# Patient Record
Sex: FEMALE | Race: BLACK OR AFRICAN AMERICAN | Employment: FULL TIME
[De-identification: names, ages, dates, MRNs, and addresses within clinical notes are randomized per-mention and may not be internally consistent; named-entity substitution may affect disease eponyms.]

---
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
## 2023-01-04 ENCOUNTER — PATIENT MESSAGE (OUTPATIENT)
Dept: UROGYNECOLOGY | Age: 18
End: 2023-01-04

## 2023-02-13 ENCOUNTER — TELEPHONE (OUTPATIENT)
Dept: OBGYN CLINIC | Age: 18
End: 2023-02-13

## 2023-02-13 NOTE — TELEPHONE ENCOUNTER
Patient called RN back, patient verified PHIs, patient stated they lost two out of her three birth control packs and was wondering what they need to do to obtain their RX again. RN states that they will need to contact pharmacy to refill those two lost packets of BC, due to another RX being sent in will not resolve the issue of replacing the lost BC pills. Patient verbalized understanding.

## 2023-03-21 ENCOUNTER — OFFICE VISIT (OUTPATIENT)
Dept: OBGYN CLINIC | Age: 18
End: 2023-03-21
Payer: COMMERCIAL

## 2023-03-21 VITALS
BODY MASS INDEX: 20.41 KG/M2 | SYSTOLIC BLOOD PRESSURE: 110 MMHG | HEIGHT: 66 IN | WEIGHT: 127 LBS | DIASTOLIC BLOOD PRESSURE: 60 MMHG

## 2023-03-21 DIAGNOSIS — E28.2 PCOS (POLYCYSTIC OVARIAN SYNDROME): ICD-10-CM

## 2023-03-21 PROCEDURE — 99212 OFFICE O/P EST SF 10 MIN: CPT | Performed by: NURSE PRACTITIONER

## 2023-03-21 RX ORDER — BUPROPION HYDROCHLORIDE 100 MG/1
100 TABLET, EXTENDED RELEASE ORAL EVERY MORNING
COMMUNITY
Start: 2023-02-22

## 2023-03-21 ASSESSMENT — PATIENT HEALTH QUESTIONNAIRE - PHQ9
SUM OF ALL RESPONSES TO PHQ QUESTIONS 1-9: 0
1. LITTLE INTEREST OR PLEASURE IN DOING THINGS: 0
SUM OF ALL RESPONSES TO PHQ QUESTIONS 1-9: 0
SUM OF ALL RESPONSES TO PHQ QUESTIONS 1-9: 0
SUM OF ALL RESPONSES TO PHQ9 QUESTIONS 1 & 2: 0
2. FEELING DOWN, DEPRESSED OR HOPELESS: 0
SUM OF ALL RESPONSES TO PHQ QUESTIONS 1-9: 0

## 2023-03-21 NOTE — ASSESSMENT & PLAN NOTE
11/30/2022:     Menses Q 2-3 months  Will complete TSH, PRL, POF and PCOS labs today and f/u 2 weeks for US and visit  We discuss trial of OCPs and pt would like to wait until after labs/US to discuss            12/21/22:  Declines upt, not currently sexually active  Pt meets 2/3 Rotterdham criteria (polycystic ovaries and anovulatory bleeding)  Lengthy D/W pt about diagnosis, etiology and treatment options, including but not limited to: weight gain, anovulation, hirsutism, effect on fertility, increased risks for CAD and endometrial hyperplasia.   Encouraged increase in exercise and recommended low sugar/carb diet.       Will trial OCPs and RTO 3 months    3/21/2023: pt doing well with OCPs, no concerns today  F/u Dec for annual exam

## 2023-03-22 NOTE — PROGRESS NOTES
I have reviewed the patient's visit today including history, exam and assessment by HEYDI Brunner. I agree with treatment/plan as above.     Alexandre Santiago MD  11:40 AM  03/22/23
Patient comes in today for 3 month follow up on birth control for PCOS. Patient states she thinks the birth control is working. LAST PAP:  Never    LAST MAMMO:  Never    LMP:  Patient's last menstrual period was 03/18/2023 (exact date).     BIRTH CONTROL:  OCP (estrogen/progesterone)    TOBACCO USE:  No    FAMILY HISTORY OF:   Breast Cancer:  No   Ovarian Cancer:  No   Uterine Cancer:  No   Colon Cancer:  No    Vitals:    03/21/23 1541   BP: 110/60   Site: Left Upper Arm   Position: Sitting   Weight: 127 lb (57.6 kg)   Height: 5' 6\" (1.676 m)        Mireya Peterson MA  03/21/23  3:50 PM
OCPs and pt would like to wait until after labs/US to discuss            12/21/22:  Declines upt, not currently sexually active  Pt meets 2/3 Rotterdham criteria (polycystic ovaries and anovulatory bleeding)  Lengthy D/W pt about diagnosis, etiology and treatment options, including but not limited to: weight gain, anovulation, hirsutism, effect on fertility, increased risks for CAD and endometrial hyperplasia. Encouraged increase in exercise and recommended low sugar/carb diet. Will trial OCPs and RTO 3 months    3/21/2023: pt doing well with OCPs, no concerns today  F/u Dec for annual exam            No orders of the defined types were placed in this encounter. Outpatient Encounter Medications as of 3/21/2023   Medication Sig Dispense Refill    buPROPion (WELLBUTRIN SR) 100 MG extended release tablet Take 100 mg by mouth every morning      norethindrone-ethinyl estradiol (LOESTRIN FE 1/20) 1-20 MG-MCG per tablet Take 1 tablet by mouth daily 3 packet 4    albuterol sulfate HFA (PROVENTIL;VENTOLIN;PROAIR) 108 (90 Base) MCG/ACT inhaler Inhale 1 puff into the lungs every 4 hours as needed for Wheezing 18 g 0    loratadine (CLARITIN) 10 MG tablet Take 10 mg by mouth daily as needed      escitalopram (LEXAPRO) 10 MG tablet TAKE 1/2 A TABLET BY MOUTH EVERYDAY FOR 1 WEEK, THEN TAKE 1 TABLET BY MOUTH DAILY (Patient not taking: No sig reported)      [DISCONTINUED] metroNIDAZOLE (FLAGYL) 500 MG tablet Take 1 tablet by mouth 2 times daily (Patient not taking: Reported on 12/21/2022) 14 tablet 0     No facility-administered encounter medications on file as of 3/21/2023.                Corin Degroot, APRN - CNP 03/21/23 4:09 PM

## 2023-05-19 ENCOUNTER — OFFICE VISIT (OUTPATIENT)
Dept: OBGYN CLINIC | Age: 18
End: 2023-05-19

## 2023-05-19 VITALS
DIASTOLIC BLOOD PRESSURE: 56 MMHG | HEIGHT: 66 IN | SYSTOLIC BLOOD PRESSURE: 102 MMHG | BODY MASS INDEX: 20.73 KG/M2 | WEIGHT: 129 LBS

## 2023-05-19 DIAGNOSIS — Z11.3 SCREEN FOR STD (SEXUALLY TRANSMITTED DISEASE): ICD-10-CM

## 2023-05-19 DIAGNOSIS — N91.2 AMENORRHEA: Primary | ICD-10-CM

## 2023-05-19 DIAGNOSIS — L98.9 ABNORMAL SKIN OF VULVA: ICD-10-CM

## 2023-05-19 LAB
HCG, PREGNANCY, URINE, POC: NEGATIVE
VALID INTERNAL CONTROL, POC: YES

## 2023-05-19 ASSESSMENT — PATIENT HEALTH QUESTIONNAIRE - PHQ9
SUM OF ALL RESPONSES TO PHQ QUESTIONS 1-9: 0
SUM OF ALL RESPONSES TO PHQ QUESTIONS 1-9: 0
1. LITTLE INTEREST OR PLEASURE IN DOING THINGS: 0
SUM OF ALL RESPONSES TO PHQ9 QUESTIONS 1 & 2: 0
2. FEELING DOWN, DEPRESSED OR HOPELESS: 0
SUM OF ALL RESPONSES TO PHQ QUESTIONS 1-9: 0
SUM OF ALL RESPONSES TO PHQ QUESTIONS 1-9: 0

## 2023-05-19 NOTE — ASSESSMENT & PLAN NOTE
Post fourchette 4-5 small areas of hyperpigmentation, no lesions/ulcerations noted  Pt requests GC/CT/TV testing  We discuss today could be normal variation with hormonal treatment or possibly beginning changes of wart development, but if lesions change to RTO for visit.  F/u scheduled 3 months

## 2023-05-19 NOTE — PROGRESS NOTES
Patient here for concerns of \"bumps\" that patient first noticed approx 1 month ago that patient describes as mole\" like. Patient denies burning and itching today. LAST PAP:  never     LAST MAMMO:  never     LMP:  Patient's last menstrual period was 04/20/2023 (exact date). BIRTH CONTROL:  none    Patient stopped BC due to lack of period. Patient stopped taking it beginning of May 2023.      TOBACCO USE:  No    FAMILY HISTORY OF:   Breast Cancer:  No   Ovarian Cancer:  No   Uterine Cancer:  No   Colon Cancer:  No    Vitals:    05/19/23 1114   BP: (!) 102/56   Site: Left Upper Arm   Position: Sitting   Weight: 129 lb (58.5 kg)   Height: 5' 6\" (1.676 m)        Arsh Hughes RN  05/19/23  11:25 AM
changes of wart development, but if lesions change to RTO for visit. F/u scheduled 3 months            Other    Amenorrhea - Primary     Normal bleeding variant with OCPs  UPT neg  Pt advised she can continue         Relevant Orders    AMB POC URINE PREGNANCY TEST, VISUAL COLOR COMPARISON (Completed)     Other Visit Diagnoses       Screen for STD (sexually transmitted disease)        Relevant Orders    Chlamydia, Gonorrhea, Trichomoniasis            Orders Placed This Encounter   Procedures    Chlamydia, Gonorrhea, Trichomoniasis    AMB POC URINE PREGNANCY TEST, VISUAL COLOR COMPARISON       Outpatient Encounter Medications as of 5/19/2023   Medication Sig Dispense Refill    buPROPion (WELLBUTRIN SR) 100 MG extended release tablet Take 1 tablet by mouth every morning      albuterol sulfate HFA (PROVENTIL;VENTOLIN;PROAIR) 108 (90 Base) MCG/ACT inhaler Inhale 1 puff into the lungs every 4 hours as needed for Wheezing 18 g 0    loratadine (CLARITIN) 10 MG tablet Take 1 tablet by mouth daily as needed      norethindrone-ethinyl estradiol (LOESTRIN FE 1/20) 1-20 MG-MCG per tablet Take 1 tablet by mouth daily (Patient not taking: Reported on 5/19/2023) 3 packet 4    [DISCONTINUED] escitalopram (LEXAPRO) 10 MG tablet TAKE 1/2 A TABLET BY MOUTH EVERYDAY FOR 1 WEEK, THEN TAKE 1 TABLET BY MOUTH DAILY (Patient not taking: No sig reported)       No facility-administered encounter medications on file as of 5/19/2023.                ROSETTE Liu CNP 05/19/23 11:44 AM

## 2023-05-23 LAB
C TRACH RRNA SPEC QL NAA+PROBE: NEGATIVE
N GONORRHOEA RRNA SPEC QL NAA+PROBE: NEGATIVE
SPECIMEN SOURCE: NORMAL
T VAGINALIS RRNA SPEC QL NAA+PROBE: NEGATIVE

## 2023-08-15 ENCOUNTER — OFFICE VISIT (OUTPATIENT)
Dept: OBGYN CLINIC | Age: 18
End: 2023-08-15
Payer: COMMERCIAL

## 2023-08-15 VITALS
HEIGHT: 66 IN | SYSTOLIC BLOOD PRESSURE: 112 MMHG | BODY MASS INDEX: 22.02 KG/M2 | DIASTOLIC BLOOD PRESSURE: 58 MMHG | WEIGHT: 137 LBS

## 2023-08-15 DIAGNOSIS — N92.6 LATE MENSES: ICD-10-CM

## 2023-08-15 DIAGNOSIS — R30.0 DYSURIA: Primary | ICD-10-CM

## 2023-08-15 DIAGNOSIS — N89.8 VAGINAL DISCHARGE: ICD-10-CM

## 2023-08-15 LAB
BILIRUBIN, URINE, POC: NEGATIVE
BLOOD URINE, POC: NEGATIVE
GLUCOSE URINE, POC: NEGATIVE
HCG, PREGNANCY, URINE, POC: NEGATIVE
KETONES, URINE, POC: NEGATIVE
LEUKOCYTE ESTERASE, URINE, POC: ABNORMAL
NITRITE, URINE, POC: NEGATIVE
PH, URINE, POC: 7.5 (ref 4.6–8)
PROTEIN,URINE, POC: 100
SPECIFIC GRAVITY, URINE, POC: 1.02 (ref 1–1.03)
URINALYSIS CLARITY, POC: ABNORMAL
URINALYSIS COLOR, POC: YELLOW
UROBILINOGEN, POC: NORMAL
VALID INTERNAL CONTROL, POC: YES

## 2023-08-15 PROCEDURE — 81025 URINE PREGNANCY TEST: CPT | Performed by: NURSE PRACTITIONER

## 2023-08-15 PROCEDURE — 81001 URINALYSIS AUTO W/SCOPE: CPT | Performed by: NURSE PRACTITIONER

## 2023-08-15 PROCEDURE — 99213 OFFICE O/P EST LOW 20 MIN: CPT | Performed by: NURSE PRACTITIONER

## 2023-08-15 RX ORDER — FLUCONAZOLE 150 MG/1
150 TABLET ORAL
Qty: 2 TABLET | Refills: 0 | Status: SHIPPED | OUTPATIENT
Start: 2023-08-15 | End: 2023-08-19

## 2023-08-15 ASSESSMENT — PATIENT HEALTH QUESTIONNAIRE - PHQ9
SUM OF ALL RESPONSES TO PHQ QUESTIONS 1-9: 2
SUM OF ALL RESPONSES TO PHQ9 QUESTIONS 1 & 2: 2
SUM OF ALL RESPONSES TO PHQ QUESTIONS 1-9: 2
2. FEELING DOWN, DEPRESSED OR HOPELESS: 1
SUM OF ALL RESPONSES TO PHQ QUESTIONS 1-9: 2
1. LITTLE INTEREST OR PLEASURE IN DOING THINGS: 1
SUM OF ALL RESPONSES TO PHQ QUESTIONS 1-9: 2

## 2023-08-15 NOTE — PROGRESS NOTES
I have reviewed the patient's visit today including history, exam and assessment by HEYDI Diop. I agree with treatment/plan as above.     Quintin Milan MD  4:14 PM  08/15/23

## 2023-08-15 NOTE — ASSESSMENT & PLAN NOTE
Small amt of discharge c/w yeast  nuswab collected  rx sent for diflucan  UA 1+ leuks, will send for culture

## 2023-08-15 NOTE — PROGRESS NOTES
Patient here for concerns of vaginal irritation, groin itching, and urinary burning for the last 1-2 weeks. F/u on BC-patient states she she would like to continue her [facility]. LAST PAP:  never    LAST MAMMO:  never    LMP:  Patient's last menstrual period was 07/14/2023 (approximate).     Sexually active:  yes     BIRTH CONTROL:  OCP (estrogen/progesterone)    TOBACCO USE:  No    PHQ: 2      FAMILY HISTORY OF:   Breast Cancer:  No   Ovarian Cancer:  No   Uterine Cancer:  No   Colon Cancer:  No    Vitals:    08/15/23 1528   BP: (!) 112/58   Site: Left Upper Arm   Position: Sitting   Weight: 137 lb (62.1 kg)   Height: 5' 6\" (1.676 m)        Radha Rodriguez RN  08/15/23  3:34 PM

## 2023-08-15 NOTE — PROGRESS NOTES
Collin Sutton is a 19 y.o. Nguyễn Scruggs who is here with c/o vaginal itching and dysuria for the last week. Denies discharge and odor. Has been working out more lately. Denies recent antibiotic use or change in hygiene products. Pt on OCPs for birth control      Patient's last menstrual period was 07/14/2023 (approximate). Past Medical History:   Diagnosis Date    ADHD     Asthma     chronic    Asthma, mild intermittent     Depression     Difficulty controlling anger     Dysuria     Exercise-induced asthma     OE (otitis externa)        History reviewed. No pertinent surgical history. Family History   Problem Relation Age of Onset    No Known Problems Father     No Known Problems Mother     Breast Cancer Neg Hx     Ovarian Cancer Neg Hx     Uterine Cancer Neg Hx     Colon Cancer Neg Hx        Social History     Socioeconomic History    Marital status: Single     Spouse name: Not on file    Number of children: Not on file    Years of education: Not on file    Highest education level: Not on file   Occupational History    Not on file   Tobacco Use    Smoking status: Never    Smokeless tobacco: Never   Substance and Sexual Activity    Alcohol use: No    Drug use: No    Sexual activity: Yes     Birth control/protection: OCP   Other Topics Concern    Not on file   Social History Narrative    Abuse: Feels safe at home, no history of physical abuse, no history of sexual abuse      Social Determinants of Health     Financial Resource Strain: Not on file   Food Insecurity: Not on file   Transportation Needs: Not on file   Physical Activity: Not on file   Stress: Not on file   Social Connections: Not on file   Intimate Partner Violence: Not on file   Housing Stability: Not on file           Objective:    Vitals:    08/15/23 1528   BP: (!) 112/58   Site: Left Upper Arm   Position: Sitting   Weight: 137 lb (62.1 kg)   Height: 5' 6\" (1.676 m)         Constitutional:  well-developed, well-nourished, and in no distress.

## 2023-08-17 LAB
BACTERIA SPEC CULT: ABNORMAL
SERVICE CMNT-IMP: ABNORMAL

## 2023-08-17 NOTE — TELEPHONE ENCOUNTER
Called pt, phone went straight to voicemail, voicemail box was not set up so unable to LVM.      [employer] message sent

## 2023-08-17 NOTE — TELEPHONE ENCOUNTER
Urine cx is positive for UTI.     Needs to start Amoxicillin 500 mg PO Q8 hours for 7 days Disp: 21 RF:0

## 2023-08-18 ENCOUNTER — TELEPHONE (OUTPATIENT)
Dept: OBGYN CLINIC | Age: 18
End: 2023-08-18

## 2023-08-18 LAB
A VAGINAE DNA VAG QL NAA+PROBE: NORMAL SCORE
BVAB2 DNA VAG QL NAA+PROBE: NORMAL SCORE
C ALBICANS DNA VAG QL NAA+PROBE: NEGATIVE
C GLABRATA DNA VAG QL NAA+PROBE: NEGATIVE
C TRACH RRNA SPEC QL NAA+PROBE: NEGATIVE
MEGA1 DNA VAG QL NAA+PROBE: NORMAL SCORE
N GONORRHOEA RRNA SPEC QL NAA+PROBE: NEGATIVE
SPECIMEN SOURCE: NORMAL
T VAGINALIS RRNA SPEC QL NAA+PROBE: NEGATIVE

## 2023-08-18 RX ORDER — AMOXICILLIN 500 MG/1
500 CAPSULE ORAL EVERY 8 HOURS
Qty: 21 CAPSULE | Refills: 0 | Status: SHIPPED | OUTPATIENT
Start: 2023-08-18 | End: 2023-08-25

## 2023-08-24 ENCOUNTER — TELEPHONE (OUTPATIENT)
Dept: OBGYN CLINIC | Age: 18
End: 2023-08-24

## 2023-08-24 LAB
C ALBICANS DNA VAG QL NAA+PROBE: NORMAL
C GLABRATA DNA VAG QL NAA+PROBE: NORMAL
CANDIDA ALBICANS: NORMAL
CANDIDA GLABRATA: NORMAL
CANDIDA KRUSEI: NEGATIVE
CANDIDA LUSITANIAE, NAA, 180015: NEGATIVE
CANDIDA PARAPSILOSIS DNA, VAG: NORMAL
CANDIDA PARAPSILOSIS/TROPICALIS: NEGATIVE
CANDIDA TROPICALIS DNA, VAG: NORMAL
M GENITALIUM DNA SPEC QL NAA+PROBE: NORMAL
M HOMINIS DNA SPEC QL NAA+PROBE: NORMAL
SPECIMEN SOURCE: NORMAL
SPECIMEN SOURCE: NORMAL
UREAPLASMA DNA SPEC QL NAA+PROBE: NORMAL

## 2023-08-24 NOTE — TELEPHONE ENCOUNTER
Lab was unable to add on ureaplasma/mycoplasma due to insufficient specimen for the addon. If still having symptoms, please schedule appt for reswab.  thanks

## 2023-10-26 ENCOUNTER — OFFICE VISIT (OUTPATIENT)
Dept: OBGYN CLINIC | Age: 18
End: 2023-10-26

## 2023-10-26 VITALS
DIASTOLIC BLOOD PRESSURE: 60 MMHG | WEIGHT: 137 LBS | HEIGHT: 66 IN | SYSTOLIC BLOOD PRESSURE: 112 MMHG | BODY MASS INDEX: 22.02 KG/M2

## 2023-10-26 DIAGNOSIS — N89.8 VAGINA ITCHING: Primary | ICD-10-CM

## 2023-10-26 DIAGNOSIS — N92.6 IRREGULAR MENSES: ICD-10-CM

## 2023-10-26 DIAGNOSIS — N89.8 VAGINAL DISCHARGE: ICD-10-CM

## 2023-10-26 LAB
HCG, PREGNANCY, URINE, POC: NEGATIVE
VALID INTERNAL CONTROL, POC: YES

## 2023-10-26 RX ORDER — NORETHINDRONE ACETATE AND ETHINYL ESTRADIOL 1; 20 MG/1; UG/1
1 TABLET ORAL DAILY
COMMUNITY
Start: 2023-08-30 | End: 2023-10-26

## 2023-10-26 RX ORDER — FLUCONAZOLE 150 MG/1
150 TABLET ORAL
Qty: 2 TABLET | Refills: 0 | Status: SHIPPED | OUTPATIENT
Start: 2023-10-26 | End: 2023-10-30

## 2023-10-26 NOTE — ASSESSMENT & PLAN NOTE
Symptoms c/w yeast infection, on menses so minimal D/C seen  Diflucan rx sent  nuswab collected    We discuss the following  You can take the following steps to help prevent future vaginal infections: Wipe from front to back. Wear cotton underwear. No tight clothing. Avoid bubble baths. No scented soaps, washes, tampons. No douching.

## 2023-10-26 NOTE — PROGRESS NOTES
I have reviewed the patient's visit today including history, exam and assessment by HEYDI Sanchez. I agree with treatment/plan as above.     Cathleen Berkowitz MD  10:05 AM  10/26/23
Pt comes in today for swelling and irritation. Pt sates this start last Sunday. Pt states white thick discharge. Pt states after her last period she spotted for about a month but she has skipped pills in the past.     LAST PAP:  never    LAST MAMMO:  never    LMP:  Patient's last menstrual period was 10/25/2023 (exact date).     BIRTH CONTROL:  OCP (estrogen/progesterone)    TOBACCO USE:  No    FAMILY HISTORY OF:   Breast Cancer:  No   Ovarian Cancer:  No   Uterine Cancer:  No   Colon Cancer:  No    Vitals:    10/26/23 0937   Weight: 62.1 kg (137 lb)   Height: 1.676 m (5' 6\")        [place]  10/26/23  9:48 AM
distress. Mental: Alert and awake. Oriented to person/place/time. Able to follow commands    Eyes: EOM nl, Sclera nl, Ocular Discharge not visualized    HENT: Normocephalic and atraumatic. Mouth/Throat: Mucous membranes are moist    External Ears: nl    Neck: Normal range of motion. No masses visualized       Pulmonary: Effort normal. No visualized signs of difficulty breathing or respiratory distress    Pelvic Exam:       External: normal female genitalia without lesions or masses       Vagina: normal without lesions or masses, on menses but some cottage cheese like  discharge noted      Cervix: normal without lesions or masses       Musculoskeletal: Normal range of motion. Normal gait with no signs of ataxia     Neurological: No Facial Asymmetry (Cranial nerve 7 motor function) No gaze palsy    Skin: No significant exanthematous lesions or discoloration noted on facial skin      Psych: Normal affect. No hallucinations              Assessment/Plan            Patient Active Problem List    Diagnosis Date Noted    PCOS (polycystic ovarian syndrome) 11/30/2022     Priority: Medium    Vaginal discharge 11/30/2022     Priority: Medium     Assessment & Plan Note:     Symptoms c/w yeast infection, on menses so minimal D/C seen  Diflucan rx sent  nuswab collected    We discuss the following  You can take the following steps to help prevent future vaginal infections: Wipe from front to back. Wear cotton underwear. No tight clothing. Avoid bubble baths. No scented soaps, washes, tampons. No douching.           Amenorrhea 05/19/2023    Abnormal skin of vulva 05/19/2023    Asthma, mild intermittent     Exercise-induced asthma        Problem List Items Addressed This Visit          Other    Vaginal discharge     Symptoms c/w yeast infection, on menses so minimal D/C seen  Diflucan rx sent  nuswab collected    We discuss the following  You can take the following steps to help prevent future vaginal infections: Wipe from

## 2023-10-29 LAB
A VAGINAE DNA VAG QL NAA+PROBE: NORMAL SCORE
BVAB2 DNA VAG QL NAA+PROBE: NORMAL SCORE
C TRACH RRNA SPEC QL NAA+PROBE: NEGATIVE
M GENITALIUM DNA SPEC QL NAA+PROBE: NEGATIVE
M HOMINIS DNA SPEC QL NAA+PROBE: NEGATIVE
MEGA1 DNA VAG QL NAA+PROBE: NORMAL SCORE
N GONORRHOEA RRNA SPEC QL NAA+PROBE: NEGATIVE
SPECIMEN SOURCE: NORMAL
T VAGINALIS RRNA SPEC QL NAA+PROBE: NEGATIVE
UREAPLASMA DNA SPEC QL NAA+PROBE: NEGATIVE

## 2023-10-30 LAB
A VAGINAE DNA VAG QL NAA+PROBE: ABNORMAL SCORE
BVAB2 DNA VAG QL NAA+PROBE: ABNORMAL SCORE
C ALBICANS DNA VAG QL NAA+PROBE: POSITIVE
C GLABRATA DNA VAG QL NAA+PROBE: NEGATIVE
C TRACH RRNA SPEC QL NAA+PROBE: NEGATIVE
M GENITALIUM DNA SPEC QL NAA+PROBE: NEGATIVE
M HOMINIS DNA SPEC QL NAA+PROBE: NEGATIVE
MEGA1 DNA VAG QL NAA+PROBE: ABNORMAL SCORE
N GONORRHOEA RRNA SPEC QL NAA+PROBE: NEGATIVE
SPECIMEN SOURCE: ABNORMAL
T VAGINALIS RRNA SPEC QL NAA+PROBE: NEGATIVE
UREAPLASMA DNA SPEC QL NAA+PROBE: NEGATIVE

## 2023-12-05 ENCOUNTER — OFFICE VISIT (OUTPATIENT)
Dept: OBGYN CLINIC | Age: 18
End: 2023-12-05
Payer: COMMERCIAL

## 2023-12-05 VITALS
HEIGHT: 66 IN | WEIGHT: 141 LBS | SYSTOLIC BLOOD PRESSURE: 104 MMHG | DIASTOLIC BLOOD PRESSURE: 62 MMHG | BODY MASS INDEX: 22.66 KG/M2

## 2023-12-05 DIAGNOSIS — Z11.3 SCREEN FOR STD (SEXUALLY TRANSMITTED DISEASE): Primary | ICD-10-CM

## 2023-12-05 DIAGNOSIS — Z01.419 WOMEN'S ANNUAL ROUTINE GYNECOLOGICAL EXAMINATION: ICD-10-CM

## 2023-12-05 PROCEDURE — 99395 PREV VISIT EST AGE 18-39: CPT | Performed by: NURSE PRACTITIONER

## 2023-12-05 RX ORDER — NORETHINDRONE ACETATE AND ETHINYL ESTRADIOL 1MG-20(21)
1 KIT ORAL DAILY
Qty: 3 PACKET | Refills: 4 | Status: SHIPPED | OUTPATIENT
Start: 2023-12-05

## 2023-12-05 ASSESSMENT — PATIENT HEALTH QUESTIONNAIRE - PHQ9
SUM OF ALL RESPONSES TO PHQ QUESTIONS 1-9: 0
1. LITTLE INTEREST OR PLEASURE IN DOING THINGS: 0
2. FEELING DOWN, DEPRESSED OR HOPELESS: 0
SUM OF ALL RESPONSES TO PHQ QUESTIONS 1-9: 0
SUM OF ALL RESPONSES TO PHQ QUESTIONS 1-9: 0
SUM OF ALL RESPONSES TO PHQ9 QUESTIONS 1 & 2: 0
SUM OF ALL RESPONSES TO PHQ QUESTIONS 1-9: 0

## 2023-12-05 NOTE — ASSESSMENT & PLAN NOTE
Pap n/a  Mammo n/a  GC/CT/TV collected, declines serum std labs  HPV vaccine #1 received, VIS reviewed and pt will let us know if she wants to complete #2 and #3  RTO 1 year

## 2023-12-05 NOTE — PROGRESS NOTES
I have reviewed the patient's visit today including history, exam and assessment by HEYDI Zamudio. I agree with treatment/plan as above.     Sara Joshua MD  10:44 AM  12/05/23
Pt comes in today for [facility] refill. Pt would like STD testing, swab only     LAST PAP:  never     LAST MAMMO:  never     LMP:  Patient's last menstrual period was 11/21/2023 (exact date).     BIRTH CONTROL:  OCP (estrogen/progesterone)    TOBACCO USE:  No    FAMILY HISTORY OF:   Breast Cancer:  No   Ovarian Cancer:  No   Uterine Cancer:  No   Colon Cancer:  No    Vitals:    12/05/23 1002   Weight: 64 kg (141 lb)   Height: 1.676 m (5' 6\")        [facility]  12/05/23  10:06 AM
(polycystic ovarian syndrome) 11/30/2022     Priority: Medium    Vaginal discharge 11/30/2022     Priority: Medium    Women's annual routine gynecological examination 12/05/2023     Assessment & Plan Note:      Pap n/a  Mammo n/a  GC/CT/TV collected, declines serum std labs  Refill sent for OCPs  Patient denies h/o DVT, stroke, MI, migraines, liver disease or HTN    HPV vaccine #1 received, VIS reviewed and pt will let us know if she wants to complete #2 and #3  RTO 1 year        Amenorrhea 05/19/2023    Abnormal skin of vulva 05/19/2023    Asthma, mild intermittent     Exercise-induced asthma        Problem List Items Addressed This Visit          Other    Women's annual routine gynecological examination      Pap n/a  Mammo n/a  GC/CT/TV collected, declines serum std labs  Refill sent for OCPs  Patient denies h/o DVT, stroke, MI, migraines, liver disease or HTN    HPV vaccine #1 received, VIS reviewed and pt will let us know if she wants to complete #2 and #3  RTO 1 year            Other Visit Diagnoses       Screen for STD (sexually transmitted disease)    -  Primary    Relevant Orders    Chlamydia, Gonorrhea, Trichomoniasis            Orders Placed This Encounter   Procedures    Chlamydia, Gonorrhea, Trichomoniasis       Outpatient Encounter Medications as of 12/5/2023   Medication Sig Dispense Refill    norethindrone-ethinyl estradiol (LOESTRIN FE 1/20) 1-20 MG-MCG per tablet Take 1 tablet by mouth daily 3 packet 4    albuterol sulfate HFA (PROVENTIL;VENTOLIN;PROAIR) 108 (90 Base) MCG/ACT inhaler Inhale 1 puff into the lungs every 4 hours as needed for Wheezing 18 g 0    loratadine (CLARITIN) 10 MG tablet Take 1 tablet by mouth daily as needed      [DISCONTINUED] norethindrone-ethinyl estradiol (LOESTRIN FE 1/20) 1-20 MG-MCG per tablet Take 1 tablet by mouth daily 3 packet 4     No facility-administered encounter medications on file as of 12/5/2023.

## 2023-12-08 ENCOUNTER — TELEPHONE (OUTPATIENT)
Dept: OBGYN CLINIC | Age: 18
End: 2023-12-08

## 2023-12-08 ENCOUNTER — PATIENT MESSAGE (OUTPATIENT)
Dept: OBGYN CLINIC | Age: 18
End: 2023-12-08

## 2023-12-08 DIAGNOSIS — A74.9 CHLAMYDIA: Primary | ICD-10-CM

## 2023-12-08 LAB
C TRACH RRNA SPEC QL NAA+PROBE: POSITIVE
N GONORRHOEA RRNA SPEC QL NAA+PROBE: NEGATIVE
SPECIMEN SOURCE: ABNORMAL
T VAGINALIS RRNA SPEC QL NAA+PROBE: NEGATIVE

## 2023-12-08 RX ORDER — AZITHROMYCIN 500 MG/1
1000 TABLET, FILM COATED ORAL ONCE
Qty: 2 TABLET | Refills: 0 | Status: SHIPPED | OUTPATIENT
Start: 2023-12-08 | End: 2023-12-08

## 2023-12-08 NOTE — TELEPHONE ENCOUNTER
Patient tested positive for Chlamydia. This is a sexually transmitted infection. This can be treated with the following, if not allergic    Azithromycin 1gram PO Once, No Refills    Patient needs to notify partner so they can follow up with their PCP or [facility] for testing and/or treatment. If patient is pregnant, we will retest during her pregnancy. If not pregnant, please schedule patient for retest in 3 months. Recommend patient abstain from intercourse for 7 days following treatment. Condom use is recommended to help prevent the spread of stds and reinfection.

## 2023-12-08 NOTE — TELEPHONE ENCOUNTER
Called patient, updated her on positive chlaymdia result. Educated patient on medication being sent in, abstaining from intercourse during the course of this treatment and at least 7 days after finishing the RX. Stated to patient that her partner will need testing and treatment as well. Educated patient to utilize condom to practice safe sex, prevent reinfection, and prevent the risk of spreading infection. Patient verbalized understanding.

## 2023-12-08 NOTE — TELEPHONE ENCOUNTER
From: Chaparrita Izaguirre  To: Leon Tolbert  Sent: 12/8/2023 11:11 AM EST  Subject: Test results     Alex Sanchez im a little confused right now. Could you go over my test results with me?

## 2024-01-04 PROBLEM — Z01.419 WOMEN'S ANNUAL ROUTINE GYNECOLOGICAL EXAMINATION: Status: RESOLVED | Noted: 2023-12-05 | Resolved: 2024-01-04

## 2024-03-04 ENCOUNTER — OFFICE VISIT (OUTPATIENT)
Dept: OBGYN CLINIC | Age: 19
End: 2024-03-04
Payer: COMMERCIAL

## 2024-03-04 VITALS
BODY MASS INDEX: 22.34 KG/M2 | SYSTOLIC BLOOD PRESSURE: 102 MMHG | HEIGHT: 66 IN | DIASTOLIC BLOOD PRESSURE: 58 MMHG | WEIGHT: 139 LBS

## 2024-03-04 DIAGNOSIS — Z11.3 SCREEN FOR STD (SEXUALLY TRANSMITTED DISEASE): ICD-10-CM

## 2024-03-04 DIAGNOSIS — Z32.02 PREGNANCY TEST NEGATIVE: Primary | ICD-10-CM

## 2024-03-04 DIAGNOSIS — A74.9 CHLAMYDIA: ICD-10-CM

## 2024-03-04 LAB
HCG, PREGNANCY, URINE, POC: NEGATIVE
VALID INTERNAL CONTROL, POC: YES

## 2024-03-04 PROCEDURE — 81025 URINE PREGNANCY TEST: CPT | Performed by: NURSE PRACTITIONER

## 2024-03-04 PROCEDURE — 99213 OFFICE O/P EST LOW 20 MIN: CPT | Performed by: NURSE PRACTITIONER

## 2024-03-04 RX ORDER — PROPRANOLOL HYDROCHLORIDE 10 MG/1
10 TABLET ORAL 3 TIMES DAILY PRN
COMMUNITY
Start: 2023-12-14 | End: 2024-12-13

## 2024-03-04 NOTE — PROGRESS NOTES
Chaperone for Intimate Exam     Chaperone was offer accepted as part of the rooming process    Chaperone: Sweetie LIN CMA

## 2024-03-04 NOTE — PROGRESS NOTES
Pt comes in today for KAREN. Pt states she went to a walk in clinic and they did a UPT but states it came back inconclusive so she would like another one     LAST PAP:  never     LAST MAMMO:  never     LMP:  Patient's last menstrual period was 02/12/2024 (exact date).    BIRTH CONTROL:  OCP (estrogen/progesterone)    TOBACCO USE:  No    FAMILY HISTORY OF:   Breast Cancer:  No   Ovarian Cancer:  No   Uterine Cancer:  No   Colon Cancer:  No    Vitals:    03/04/24 1533   BP: (!) 102/58   Site: Left Upper Arm   Position: Sitting   Weight: 63 kg (139 lb)   Height: 1.676 m (5' 6\")        Sweetie Conteh MA  03/04/24  3:43 PM

## 2024-03-04 NOTE — PROGRESS NOTES
Clari Isabel is a 18 y.o.  who is here today for test of cure. Dx chlamydia Dec 2023. Pt reports she completed all of her antibiotics. Denies any symptoms today.         Patient's last menstrual period was 2024 (exact date).          Past Medical History:   Diagnosis Date    ADHD     Asthma     chronic    Asthma, mild intermittent     Chlamydia 2023    Depression     Difficulty controlling anger     Dysuria     Exercise-induced asthma     OE (otitis externa)        History reviewed. No pertinent surgical history.    Family History   Problem Relation Age of Onset    No Known Problems Father     Polycystic Ovary Syndrome Mother     Breast Cancer Neg Hx     Ovarian Cancer Neg Hx     Uterine Cancer Neg Hx     Colon Cancer Neg Hx        Social History     Socioeconomic History    Marital status: Single     Spouse name: Not on file    Number of children: Not on file    Years of education: Not on file    Highest education level: Not on file   Occupational History    Not on file   Tobacco Use    Smoking status: Never    Smokeless tobacco: Never   Substance and Sexual Activity    Alcohol use: No    Drug use: No    Sexual activity: Yes     Partners: Male     Birth control/protection: OCP   Other Topics Concern    Not on file   Social History Narrative    Abuse: Feels safe at home, no history of physical abuse, no history of sexual abuse      Social Determinants of Health     Financial Resource Strain: Not on file   Food Insecurity: Not on file   Transportation Needs: Not on file   Physical Activity: Not on file   Stress: Not on file   Social Connections: Not on file   Intimate Partner Violence: Not on file   Housing Stability: Not on file           Objective:    Vitals:    24 1533   BP: (!) 102/58   Site: Left Upper Arm   Position: Sitting   Weight: 63 kg (139 lb)   Height: 1.676 m (5' 6\")         Constitutional:  well-developed, well-nourished, and in no distress.     Mental: Alert and awake.

## 2024-06-19 ENCOUNTER — OFFICE VISIT (OUTPATIENT)
Dept: OBGYN CLINIC | Age: 19
End: 2024-06-19
Payer: COMMERCIAL

## 2024-06-19 VITALS
BODY MASS INDEX: 21.98 KG/M2 | DIASTOLIC BLOOD PRESSURE: 62 MMHG | HEIGHT: 66 IN | WEIGHT: 136.8 LBS | SYSTOLIC BLOOD PRESSURE: 106 MMHG

## 2024-06-19 DIAGNOSIS — N89.8 VAGINAL DISCHARGE: ICD-10-CM

## 2024-06-19 DIAGNOSIS — N91.2 AMENORRHEA: Primary | ICD-10-CM

## 2024-06-19 LAB
HCG, PREGNANCY, URINE, POC: NEGATIVE
VALID INTERNAL CONTROL, POC: YES

## 2024-06-19 PROCEDURE — 99213 OFFICE O/P EST LOW 20 MIN: CPT | Performed by: NURSE PRACTITIONER

## 2024-06-19 PROCEDURE — 99459 PELVIC EXAMINATION: CPT | Performed by: NURSE PRACTITIONER

## 2024-06-19 PROCEDURE — 81025 URINE PREGNANCY TEST: CPT | Performed by: NURSE PRACTITIONER

## 2024-06-19 SDOH — ECONOMIC STABILITY: HOUSING INSECURITY
IN THE LAST 12 MONTHS, WAS THERE A TIME WHEN YOU DID NOT HAVE A STEADY PLACE TO SLEEP OR SLEPT IN A SHELTER (INCLUDING NOW)?: NO

## 2024-06-19 SDOH — ECONOMIC STABILITY: FOOD INSECURITY: WITHIN THE PAST 12 MONTHS, THE FOOD YOU BOUGHT JUST DIDN'T LAST AND YOU DIDN'T HAVE MONEY TO GET MORE.: NEVER TRUE

## 2024-06-19 SDOH — ECONOMIC STABILITY: FOOD INSECURITY: WITHIN THE PAST 12 MONTHS, YOU WORRIED THAT YOUR FOOD WOULD RUN OUT BEFORE YOU GOT MONEY TO BUY MORE.: NEVER TRUE

## 2024-06-19 SDOH — ECONOMIC STABILITY: INCOME INSECURITY: HOW HARD IS IT FOR YOU TO PAY FOR THE VERY BASICS LIKE FOOD, HOUSING, MEDICAL CARE, AND HEATING?: NOT HARD AT ALL

## 2024-06-19 NOTE — PROGRESS NOTES
Patient here for vaginal irritation that started a week ago.   Patient took left over diflucan pills once a day for three days 6/14/-6/16/2024.  Patient states her symptoms of vaginal irritation have improved since then but still has some today.     Patient would like to discuss recommendations for better vaginal health such as supplements, products, medications, etc.     LAST PAP:  never     LAST MAMMO:  never     LMP:  Patient's last menstrual period was 04/18/2024 (approximate).    BIRTH CONTROL:  OCP (estrogen/progesterone)    TOBACCO USE:  No    FAMILY HISTORY OF:   Breast Cancer:  No   Ovarian Cancer:  No   Uterine Cancer:  No   Colon Cancer:  No    Vitals:    06/19/24 1042   BP: 106/62   Site: Left Upper Arm   Position: Sitting   Weight: 62.1 kg (136 lb 12.8 oz)   Height: 1.676 m (5' 6\")        LETHA SIMON RN  06/19/24  10:51 AM

## 2024-06-19 NOTE — PATIENT INSTRUCTIONS
Thank you for coming today    You can take the following steps to help prevent future  vaginal infections: Wipe from front to back.  Wear cotton underwear.  No tight clothing.  Avoid bubble baths.  No scented soaps, detergents or other hygiene products.  No douching. Use condoms during intercourse. After intercourse, go to the bathroom to urinate and clean the vaginal area.      You can try boric acid suppositories (PhD) or a probiotic

## 2024-06-19 NOTE — PROGRESS NOTES
Clari Isabel is a 19 y.o.  who is here today with concern for yeast infection. C/o vaginal irritation. Took left over diflucan once daily for 3 days and symptoms improved but still present. Denies UTI symptoms. On Lo Loestrin for birth control. UPT today negative.       Patient's last menstrual period was 2024 (approximate).          Past Medical History:   Diagnosis Date    ADHD     Asthma     chronic    Asthma, mild intermittent     Chlamydia 2023    Depression     Difficulty controlling anger     Dysuria     Exercise-induced asthma     OE (otitis externa)        No past surgical history on file.    Family History   Problem Relation Age of Onset    No Known Problems Father     Polycystic Ovary Syndrome Mother     Breast Cancer Neg Hx     Ovarian Cancer Neg Hx     Uterine Cancer Neg Hx     Colon Cancer Neg Hx        Social History     Socioeconomic History    Marital status: Single     Spouse name: Not on file    Number of children: Not on file    Years of education: Not on file    Highest education level: Not on file   Occupational History    Not on file   Tobacco Use    Smoking status: Never    Smokeless tobacco: Never   Substance and Sexual Activity    Alcohol use: No    Drug use: No    Sexual activity: Yes     Partners: Male     Birth control/protection: OCP   Other Topics Concern    Not on file   Social History Narrative    Abuse: Feels safe at home, no history of physical abuse, no history of sexual abuse      Social Determinants of Health     Financial Resource Strain: Low Risk  (2024)    Overall Financial Resource Strain (CARDIA)     Difficulty of Paying Living Expenses: Not hard at all   Food Insecurity: No Food Insecurity (2024)    Hunger Vital Sign     Worried About Running Out of Food in the Last Year: Never true     Ran Out of Food in the Last Year: Never true   Transportation Needs: Unknown (2024)    PRAPARE - Transportation     Lack of Transportation (Medical): Not

## 2024-06-19 NOTE — PROGRESS NOTES
I have reviewed the patient's visit today including history, exam and assessment by HEYDI Mancini.  I agree with treatment/plan as above.    Hector Montes MD  12:09 PM  06/19/24

## 2024-06-19 NOTE — PROGRESS NOTES
Chaperone for Intimate Exam     Chaperone was offer accepted as part of the rooming process    Chaperone: Yara Mayen

## 2024-06-19 NOTE — ASSESSMENT & PLAN NOTE
Exam c/w yeast infection  Already taken diflucan she had at home  Nuswab collected, will tx with terazol  F/u as scheduled for AE

## 2024-06-21 LAB
A VAGINAE DNA VAG QL NAA+PROBE: ABNORMAL SCORE
BVAB2 DNA VAG QL NAA+PROBE: ABNORMAL SCORE
C ALBICANS DNA VAG QL NAA+PROBE: POSITIVE
C GLABRATA DNA VAG QL NAA+PROBE: NEGATIVE
C TRACH RRNA SPEC QL NAA+PROBE: NEGATIVE
MEGA1 DNA VAG QL NAA+PROBE: ABNORMAL SCORE
N GONORRHOEA RRNA SPEC QL NAA+PROBE: NEGATIVE
SPECIMEN SOURCE: ABNORMAL
T VAGINALIS RRNA SPEC QL NAA+PROBE: NEGATIVE

## 2024-07-03 NOTE — TELEPHONE ENCOUNTER
Patient came into office today requesting status on medication. Pt is stating that per pharmacy Dr. Cordero is not authorize to send this medications. Pt requesting medication to be send as soon as possible.    Please let pt know her nuswab was negative. If still having symptoms we can add on testing for add'l strains of yeast as well as ureaplasma/mycoplasma.

## 2024-12-12 ENCOUNTER — OFFICE VISIT (OUTPATIENT)
Dept: OBGYN CLINIC | Age: 19
End: 2024-12-12
Payer: COMMERCIAL

## 2024-12-12 VITALS
BODY MASS INDEX: 23.63 KG/M2 | SYSTOLIC BLOOD PRESSURE: 112 MMHG | HEIGHT: 66 IN | DIASTOLIC BLOOD PRESSURE: 68 MMHG | WEIGHT: 147 LBS

## 2024-12-12 DIAGNOSIS — N91.2 AMENORRHEA: ICD-10-CM

## 2024-12-12 DIAGNOSIS — N89.8 VAGINAL DISCHARGE: ICD-10-CM

## 2024-12-12 DIAGNOSIS — N92.6 IRREGULAR MENSES: Primary | ICD-10-CM

## 2024-12-12 DIAGNOSIS — Z12.4 PAP SMEAR FOR CERVICAL CANCER SCREENING: ICD-10-CM

## 2024-12-12 DIAGNOSIS — Z01.419 WOMEN'S ANNUAL ROUTINE GYNECOLOGICAL EXAMINATION: ICD-10-CM

## 2024-12-12 DIAGNOSIS — Z11.3 SCREEN FOR STD (SEXUALLY TRANSMITTED DISEASE): ICD-10-CM

## 2024-12-12 LAB
HCG, PREGNANCY, URINE, POC: NEGATIVE
VALID INTERNAL CONTROL, POC: YES

## 2024-12-12 PROCEDURE — 99395 PREV VISIT EST AGE 18-39: CPT | Performed by: NURSE PRACTITIONER

## 2024-12-12 PROCEDURE — 81025 URINE PREGNANCY TEST: CPT | Performed by: NURSE PRACTITIONER

## 2024-12-12 PROCEDURE — 99459 PELVIC EXAMINATION: CPT | Performed by: NURSE PRACTITIONER

## 2024-12-12 RX ORDER — NORETHINDRONE ACETATE AND ETHINYL ESTRADIOL 1MG-20(21)
1 KIT ORAL DAILY
Qty: 3 PACKET | Refills: 4 | Status: SHIPPED | OUTPATIENT
Start: 2024-12-12

## 2024-12-12 NOTE — PROGRESS NOTES
Chaperone for Intimate Exam     Chaperone was offer accepted as part of the rooming process    Chaperone: Yara Mayen  
I have reviewed the patient's visit today including history, exam and assessment by HEYDI Mancini.  I agree with treatment/plan as above.    Hector Montes MD  10:27 AM  12/12/24   
Patient here for AE.  Patient would like to discuss if there another BC option that does not cause her bloating.     LAST PAP:  never     LAST MAMMO:  never     LMP:  Patient's last menstrual period was 09/29/2024 (approximate).    BIRTH CONTROL:  OCP (estrogen/progesterone)    TOBACCO USE:  No    FAMILY HISTORY OF:   Breast Cancer:  No   Ovarian Cancer:  No   Uterine Cancer:  No   Colon Cancer:  yes-maternal uncle     Vitals:    12/12/24 0949   BP: 112/68   Site: Left Upper Arm   Position: Sitting   Weight: 66.7 kg (147 lb)   Height: 1.676 m (5' 6\")        LETHA SIMON RN  12/12/24  9:58 AM    
Mouth/Throat: Mucous membranes are moist. External Ears: nl. No cervical code adneopathy    Neck: Normal range of motion. Neck supple. No thyromegaly present.     Cardiovascular: Normal rate and regular rhythm.      Pulmonary: Effort normal; No visualized signs of difficulty breathing or respiratory distress; breath sounds normal.    Breast: The breasts exhibit no masses, no skin changes and no tenderness. No axillary node adenopathy    Abdominal: Soft. There is no tenderness. There is no rebound.     Pelvic Exam:       External: normal female genitalia without lesions or masses       Vagina: normal without lesions or masses, scant clear physiologic discharge noted      Cervix: normal without lesions or masses       Adnexa: normal bimanual exam without masses or fullness       Uterus: uterus is normal size, shape, consistency and nontender    Musculoskeletal: Normal range of motion. Normal gait with no signs of ataxia     Neurological: No Facial Asymmetry (Cranial nerve 7 motor function); No gaze palsy; normal coordination, muscle strength and tone     Skin: Skin is warm and dry. No significant exanthematous lesions or discoloration noted on facial skin      Psych: Normal affect. No hallucinations            Assessment/Plan            Patient Active Problem List    Diagnosis Date Noted    PCOS (polycystic ovarian syndrome) 11/30/2022     Priority: Medium    Vaginal discharge 11/30/2022     Priority: Medium    Women's annual routine gynecological examination 12/12/2024     Assessment & Plan Note:     Pap today+ GC/CT/TV  Declines std screening  Mammo n/a  Gardasil received  Rto 1 year    Thinking about switching to nexplanon, OCPs causing bloating. Will let us know      Chlamydia 03/04/2024    Amenorrhea 05/19/2023    Abnormal skin of vulva 05/19/2023    Asthma, mild intermittent     Exercise-induced asthma        Problem List Items Addressed This Visit          Other    Vaginal discharge    Relevant Orders    PAP

## 2024-12-12 NOTE — ASSESSMENT & PLAN NOTE
Pap today+ GC/CT/TV  Declines std screening  Mammo n/a  Gardasil received  Rto 1 year    Thinking about switching to nexplanon, OCPs causing bloating. Will let us know

## 2024-12-17 LAB
C TRACH RRNA CVX QL NAA+PROBE: NEGATIVE
COLLECTION METHOD: NORMAL
CYTOLOGIST CVX/VAG CYTO: NORMAL
CYTOLOGY CVX/VAG DOC THIN PREP: NORMAL
DATE OF LMP: NORMAL
HPV REFLEX: NORMAL
Lab: NORMAL
N GONORRHOEA RRNA CVX QL NAA+PROBE: NEGATIVE
PAP SOURCE: NORMAL
PATH REPORT.FINAL DX SPEC: NORMAL
STAT OF ADQ CVX/VAG CYTO-IMP: NORMAL
T VAGINALIS RRNA SPEC QL NAA+PROBE: NEGATIVE

## 2025-01-11 PROBLEM — Z01.419 WOMEN'S ANNUAL ROUTINE GYNECOLOGICAL EXAMINATION: Status: RESOLVED | Noted: 2024-12-12 | Resolved: 2025-01-11

## 2025-05-09 ENCOUNTER — HOSPITAL ENCOUNTER (EMERGENCY)
Age: 20
Discharge: HOME | End: 2025-05-10
Payer: COMMERCIAL

## 2025-05-09 DIAGNOSIS — R55 NEAR SYNCOPE: Primary | ICD-10-CM

## 2025-05-09 PROCEDURE — 99284 EMERGENCY DEPT VISIT MOD MDM: CPT

## 2025-05-09 ASSESSMENT — LIFESTYLE VARIABLES
HOW OFTEN DO YOU HAVE A DRINK CONTAINING ALCOHOL: 2-4 TIMES A MONTH
HOW MANY STANDARD DRINKS CONTAINING ALCOHOL DO YOU HAVE ON A TYPICAL DAY: 3 OR 4

## 2025-05-10 VITALS
HEIGHT: 67 IN | TEMPERATURE: 97.7 F | OXYGEN SATURATION: 99 % | BODY MASS INDEX: 21.19 KG/M2 | WEIGHT: 135 LBS | RESPIRATION RATE: 20 BRPM | SYSTOLIC BLOOD PRESSURE: 122 MMHG | HEART RATE: 68 BPM | DIASTOLIC BLOOD PRESSURE: 74 MMHG

## 2025-05-10 LAB
ANION GAP SERPL CALC-SCNC: 12 MMOL/L (ref 7–16)
BASOPHILS # BLD: 0.05 K/UL (ref 0–0.2)
BASOPHILS NFR BLD: 0.6 % (ref 0–2)
BUN SERPL-MCNC: 11 MG/DL (ref 6–23)
CALCIUM SERPL-MCNC: 9.6 MG/DL (ref 8.8–10.2)
CHLORIDE SERPL-SCNC: 100 MMOL/L (ref 98–107)
CO2 SERPL-SCNC: 24 MMOL/L (ref 20–29)
CREAT SERPL-MCNC: 0.64 MG/DL (ref 0.6–1.1)
DIFFERENTIAL METHOD BLD: NORMAL
EKG ATRIAL RATE: 69 BPM
EKG DIAGNOSIS: NORMAL
EKG P AXIS: 40 DEGREES
EKG P-R INTERVAL: 162 MS
EKG Q-T INTERVAL: 366 MS
EKG QRS DURATION: 86 MS
EKG QTC CALCULATION (BAZETT): 392 MS
EKG R AXIS: 65 DEGREES
EKG T AXIS: 53 DEGREES
EKG VENTRICULAR RATE: 69 BPM
EOSINOPHIL # BLD: 0.14 K/UL (ref 0–0.8)
EOSINOPHIL NFR BLD: 1.7 % (ref 0.5–7.8)
ERYTHROCYTE [DISTWIDTH] IN BLOOD BY AUTOMATED COUNT: 13.5 % (ref 11.9–14.6)
GLUCOSE SERPL-MCNC: 93 MG/DL (ref 70–99)
HCG UR QL: NEGATIVE
HCG UR QL: NEGATIVE
HCT VFR BLD AUTO: 44.7 % (ref 35.8–46.3)
HGB BLD-MCNC: 14.5 G/DL (ref 11.7–15.4)
IMM GRANULOCYTES # BLD AUTO: 0.02 K/UL (ref 0–0.5)
IMM GRANULOCYTES NFR BLD AUTO: 0.2 % (ref 0–5)
LYMPHOCYTES # BLD: 1.99 K/UL (ref 0.5–4.6)
LYMPHOCYTES NFR BLD: 24.5 % (ref 13–44)
MCH RBC QN AUTO: 30.3 PG (ref 26.1–32.9)
MCHC RBC AUTO-ENTMCNC: 32.4 G/DL (ref 31.4–35)
MCV RBC AUTO: 93.5 FL (ref 82–102)
MONOCYTES # BLD: 0.54 K/UL (ref 0.1–1.3)
MONOCYTES NFR BLD: 6.7 % (ref 4–12)
NEUTS SEG # BLD: 5.38 K/UL (ref 1.7–8.2)
NEUTS SEG NFR BLD: 66.3 % (ref 43–78)
NRBC # BLD: 0 K/UL (ref 0–0.2)
PLATELET # BLD AUTO: 262 K/UL (ref 150–450)
PMV BLD AUTO: 11.1 FL (ref 9.4–12.3)
POTASSIUM SERPL-SCNC: 4.5 MMOL/L (ref 3.5–5.1)
RBC # BLD AUTO: 4.78 M/UL (ref 4.05–5.2)
SODIUM SERPL-SCNC: 136 MMOL/L (ref 136–145)
WBC # BLD AUTO: 8.1 K/UL (ref 4.3–11.1)

## 2025-05-10 PROCEDURE — 85025 COMPLETE CBC W/AUTO DIFF WBC: CPT

## 2025-05-10 PROCEDURE — 80048 BASIC METABOLIC PNL TOTAL CA: CPT

## 2025-05-10 PROCEDURE — 93005 ELECTROCARDIOGRAM TRACING: CPT

## 2025-05-10 PROCEDURE — 81025 URINE PREGNANCY TEST: CPT

## 2025-05-10 PROCEDURE — 93010 ELECTROCARDIOGRAM REPORT: CPT | Performed by: INTERNAL MEDICINE

## 2025-05-10 NOTE — ED PROVIDER NOTES
abnormality  Abnormal ECG  When compared with ECG of 27-APR-2015 21:25,  Vent. rate has decreased BY  47 BPM  ST elevation now present in Inferior leads  Nonspecific T wave abnormality has replaced inverted T waves in Inferior   leads  T wave inversion no longer evident in Anterior leads           No orders to display                No results for input(s): \"COVID19\" in the last 72 hours.     Voice dictation software was used during the making of this note.  This software is not perfect and grammatical and other typographical errors may be present.  This note has not been completely proofread for errors.     Richard Aguirre, DO  05/10/25 0304

## 2025-05-10 NOTE — ED TRIAGE NOTES
Pt c/o, \"I almost fainted around 9:30 this morning.  I have also been nauseas, and have had nose bleeds.\"  Nose bleeds started 3 days ago.  Pt has had 1-2 nose bleeds everyday for 3 days.      Pt has not had a period since Dec, when she stopped taking birth control.  Pt has hx of PCOS so her cycle is irregular.      Denies blurry vision, and did have a headache earlier today.

## 2025-05-10 NOTE — DISCHARGE INSTRUCTIONS
You have been seen for your fainting spell.  Your lab work looked okay.  The rest of your evaluation here also looked okay.  It is possible that this is due to an abnormal heart rhythm.  I am putting in a referral to have a Holter monitor placed with cardiology.  This is a small monitor that goes on to the scan of your chest and tries to look for any abnormal heart rhythms.  In the meantime I would like you to continue drinking plenty of fluids to remain hydrated.  Otherwise please follow-up with your primary care physician.  Return if you have any worsening symptoms or any new concerns.

## 2025-05-10 NOTE — ED NOTES
I have reviewed discharge instructions with the patient.  The patient verbalized understanding.    Patient left ED via Discharge Method: ambulatory to Home with friend.    Opportunity for questions and clarification provided.       Patient given 0 scripts.         To continue your aftercare when you leave the hospital, you may receive an automated call from our care team to check in on how you are doing.  This is a free service and part of our promise to provide the best care and service to meet your aftercare needs.” If you have questions, or wish to unsubscribe from this service please call [phone].  Thank you for Choosing our [facility] Emergency Department.

## 2025-07-03 ENCOUNTER — HOSPITAL ENCOUNTER (EMERGENCY)
Age: 20
Discharge: HOME | End: 2025-07-03
Payer: COMMERCIAL

## 2025-07-03 VITALS
OXYGEN SATURATION: 98 % | SYSTOLIC BLOOD PRESSURE: 115 MMHG | RESPIRATION RATE: 16 BRPM | HEART RATE: 85 BPM | DIASTOLIC BLOOD PRESSURE: 76 MMHG | TEMPERATURE: 98.6 F

## 2025-07-03 DIAGNOSIS — R59.9 SWELLING OF LYMPH NODE: ICD-10-CM

## 2025-07-03 DIAGNOSIS — G47.00 INSOMNIA, UNSPECIFIED TYPE: ICD-10-CM

## 2025-07-03 DIAGNOSIS — R21 RASH AND OTHER NONSPECIFIC SKIN ERUPTION: Primary | ICD-10-CM

## 2025-07-03 PROCEDURE — 99283 EMERGENCY DEPT VISIT LOW MDM: CPT

## 2025-07-03 RX ORDER — HYDROXYZINE PAMOATE 25 MG/1
25 CAPSULE ORAL 3 TIMES DAILY PRN
Qty: 30 CAPSULE | Refills: 0 | Status: SHIPPED | OUTPATIENT
Start: 2025-07-03 | End: 2025-07-17

## 2025-07-03 RX ORDER — TRIAMCINOLONE ACETONIDE 1 MG/G
CREAM TOPICAL
Qty: 28 G | Refills: 0 | Status: SHIPPED | OUTPATIENT
Start: 2025-07-03

## 2025-07-03 ASSESSMENT — PAIN DESCRIPTION - LOCATION: LOCATION: NECK

## 2025-07-03 ASSESSMENT — PAIN SCALES - GENERAL: PAINLEVEL_OUTOF10: 7

## 2025-07-03 ASSESSMENT — PAIN - FUNCTIONAL ASSESSMENT: PAIN_FUNCTIONAL_ASSESSMENT: 0-10

## 2025-07-03 NOTE — ED NOTES
Patient mobility status ambulates with no difficulty.     I have reviewed discharge instructions with the patient.  The patient verbalized understanding.    Patient left ED via Discharge Method: ambulatory to Home with self.    Opportunity for questions and clarification provided.     Patient given 0 scripts.      X 2 prescriptions sent to pharmacy

## 2025-07-03 NOTE — ED TRIAGE NOTES
Pt c/o bump under chin and rash to L side of neck x 2 days. Pt also states she has been having insomnia.

## 2025-07-03 NOTE — ED PROVIDER NOTES
Emergency Department Provider Note       E EMERGENCY DEPT   PCP: Farheen Pradhan APRN - NP   Age: 20 y.o.   Sex: female     DISPOSITION Decision To Discharge 07/03/2025 04:02:11 PM    ICD-10-CM    1. Rash and other nonspecific skin eruption  R21       2. Insomnia, unspecified type  G47.00       3. Swelling of lymph node  R59.9           Medical Decision Making     Overall well-appearing 20-year-old female presents to the emergency department today with complaint of a knot under her chin and a rash to the left side of her neck.  She appears in no acute distress.  Afebrile.  No tachycardia.  Lung sounds are clear.  She does have what appears to be a swollen submental lymph node.  Rash on the left side of the neck is erythematous.  No fluctuance or drainage.  No signs of infection.  Suspect likely contact dermatitis.  She also reports some insomnia over the past week.  She states that she will follow-up with her primary care provider regarding this and does not wish to be treated for the insomnia at this time.  Through shared decision making, we will try triamcinolone cream for the rash.  Will also provide prescription for hydroxyzine for the itching.  Advised her to continue to monitor the lymph node but I suspect that this will get better with time.  ER return precautions discussed.     1 acute, uncomplicated illness or injury.  Shared medical decision making was utilized in creating the patients health plan today.  I independently ordered and reviewed each unique test.    I reviewed external records: provider visit note from PCP.                     History     20-year-old female presents to the emergency department today with complaint of a rash to the left side of her neck and a knot under her chin.  She states that she noticed both 2 days ago.  The knot has gotten a little bit smaller.  She states that it is tender.  The rash is very itchy.  She denies any known contact with irritants.  She denies any fever,

## 2025-07-03 NOTE — DISCHARGE INSTRUCTIONS
As we discussed, I suspect that this knot under your chin is a swollen lymph node.  It  may be swollen due to your reaction from the rash.  This should gradually improve.  Take ibuprofen 600 mg every 6-8 hours.  You can also apply heat to the area to help with discomfort and swelling.  Cool compresses to the rash can help with discomfort and itching.  Apply cream as prescribed.  Take hydroxyzine as prescribed if needed for itching.  This medication may make you sleepy.  Follow-up with your primary care provider regarding the insomnia and for recheck of your current symptoms.  Return to the emergency department if you develop any new, worsening, or concerning symptoms.